# Patient Record
Sex: MALE | Race: BLACK OR AFRICAN AMERICAN | NOT HISPANIC OR LATINO | Employment: FULL TIME | URBAN - METROPOLITAN AREA
[De-identification: names, ages, dates, MRNs, and addresses within clinical notes are randomized per-mention and may not be internally consistent; named-entity substitution may affect disease eponyms.]

---

## 2019-09-05 ENCOUNTER — OFFICE VISIT (OUTPATIENT)
Dept: SLEEP MEDICINE | Facility: HOSPITAL | Age: 54
End: 2019-09-05

## 2019-09-05 VITALS
HEIGHT: 73 IN | BODY MASS INDEX: 28.63 KG/M2 | HEART RATE: 60 BPM | SYSTOLIC BLOOD PRESSURE: 124 MMHG | WEIGHT: 216 LBS | DIASTOLIC BLOOD PRESSURE: 81 MMHG

## 2019-09-05 DIAGNOSIS — G47.30 OBSERVED SLEEP APNEA: Primary | ICD-10-CM

## 2019-09-05 DIAGNOSIS — R06.83 SNORING: ICD-10-CM

## 2019-09-05 DIAGNOSIS — G47.10 HYPERSOMNIA: ICD-10-CM

## 2019-09-05 PROCEDURE — 99204 OFFICE O/P NEW MOD 45 MIN: CPT | Performed by: INTERNAL MEDICINE

## 2019-09-05 PROCEDURE — G0463 HOSPITAL OUTPT CLINIC VISIT: HCPCS

## 2019-09-05 RX ORDER — HYDROCHLOROTHIAZIDE 25 MG/1
25 TABLET ORAL DAILY
COMMUNITY
Start: 2019-06-21

## 2019-09-05 RX ORDER — LISINOPRIL 10 MG/1
10 TABLET ORAL DAILY
COMMUNITY
Start: 2018-10-22

## 2019-09-05 RX ORDER — CETIRIZINE HYDROCHLORIDE 5 MG/1
5 TABLET ORAL DAILY
COMMUNITY

## 2019-09-05 NOTE — PROGRESS NOTES
[facility]  [address] [facility] [address] [address]  Phone   Fax       Michael Dejesus  1965  54 y.o.  male      PCP:Filemon Roblero APRN  Referring Provider same as above    Type of service: Initial consult  Date of service: 9/5/2019      Chief Complaint   Patient presents with   • Sleep Apnea   • Snoring   • Daytime Sleepiness   • Fatigue       History of present illness;  Thank you for asking to see Michael Dejesus, 54 y.o.  for evaluation of sleep apnea. The patient was seen today on 9/5/2019 at [facility].  Patient is sent for evaluation of sleep apnea.  In addition he had a home sleep test in July 2019.  I have the report from [facility] [place], [place], [place].  But unfortunately the sleep study is invalid as the test duration was only 3 hours and 28 minutes.  Minimal time required is about 360 minutes or 6 hours.  However even with this limited study patient does has sleep apnea.  He also works for the [employer] and has rotating shift work.  2 days in a week he works 7A-7P, next work 12 PM to 12 AM followed by 12 AM to 7 AM.  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  Sleep schedule:  Variable sleep time due to use rotating shift work  Normally takes about 60 minutes to fall asleep  Average hours of sleep 5-6  Number of naps per day no  When patient wakes up still feels tired and not rested  Snoring yes  Witnessed apneas yes  Have you ever awakened gasping for breath, coughing, choking: Yes      Past Medical History:   Diagnosis Date   • Hypertension    • Obesity        Social history:  Shift work: Rotating shift work  Tobacco use: No  Alcohol use: No  Caffeinated drinks: 2 cups of coffee  Over-the-counter sleeping aid and medications: No  Narcotic medications: No    Family Hx  Family history of sleep apnea no family history of sleep apnea  Family History  "  Problem Relation Age of Onset   • Thyroid cancer Mother    • Hypertension Mother        Medications: reviewed    Current Outpatient Medications:   •  hydrochlorothiazide (HYDRODIURIL) 25 MG tablet, Take 25 mg by mouth Daily., Disp: , Rfl:   •  lisinopril (PRINIVIL,ZESTRIL) 10 MG tablet, Take 10 mg by mouth Daily., Disp: , Rfl:   •  cetirizine (zyrTEC) 5 MG tablet, Take 5 mg by mouth Daily., Disp: , Rfl:     Review of systems:  Seagraves Sleepiness Scale: Total score: 16   Positive for snoring, witnessed apneas, fatigue and daytime excessive sleepiness,   Negative for shortness of breath, cough, wheezing, chest pain, nausea and vomiting, palpitation, swelling of feet:    Morning headaches: 3-4 times a week  Awaken with sore throat or dry mouth : Yes  Leg jerking at night: No  Crawly feeling/urge sensation to move in the legs: No  Teeth grinding: No  Sleepwalking, nightmares, muscle weakness with laughing or anger,sleep paralysis: No  Nasal Congestion: No  Nocturia (how many times/night): No  Memory Problem: No    Physical exam:  Vitals:    09/05/19 0900   BP: 124/81   Pulse: 60   Weight: 98 kg (216 lb)   Height: 185.4 cm (73\")    Body mass index is 28.5 kg/m².    HEENT: Head is atraumatic, normocephalic   Eyes:pupils are round equal and reacting to light and accommodation, conjunctiva normal  Nose:no nasal septal defects or deviation and the nasal passages are clear, no nasal polyps,   Throat: tonsils are not enlarged, tongue normal size, oral airway Mallampati class 3  NECK: No lymphadenopathy, trachea is in the midline, thyroid not enlarged  RESPIRATORY SYSTEM: Breath sounds are equal on both sides, there are no wheezes or crackles  CARDIOVASULAR SYSTEM: Heart sounds are regular rhythm and corey rate, there are no murmurs or thrills  ABDOMEN: Soft, no hepatosplenomegaly, no evidence of ascites  EXTREMITES: No cyanosis, clubbing or edema   NEUROLOGICAL SYSTEM: Oriented x 3, no gross neurological defects, gait " normal    Medical records and labs reviewed in Epic thyroid function normal    Assessment and plan:  · Sleep apnea unspecified, (G47.30) Patient's symptoms and examination is consistent with sleep apnea.  Going to repeat a home sleep test as the first 1 has no adequate monitor time.  I have talked to the patient about importance of having adequate data.  I have talked to the patient about the signs and symptoms of sleep apnea and consequences of untreated sleep apnea. Discussed sleep testing.  I have placed a order in epic for home sleep test.  Patient will have a follow-up after this sleep test is done.   · Obesity, patient's BMI is Body mass index is 28.5 kg/m².. I have discussed the relationship between weight and sleep apnea. Weight reduction is encouraged.  I will also discussed with the patient diet and exercise.  · Snoring secondary to sleep apnea  · Hypersomnia with Walsh Sleepiness Scale of Total score: 16 due to sleep apnea.  · Hypertension.  Essential hypertension is highly correlated with sleep apnea. Treating sleep apnea will assist in good blood pressure control.  ·           Angelina De Leon MD, [facility]  Sleep Medicine.(Board-certified)  [facility]  9/5/2019 ,

## 2019-09-20 ENCOUNTER — HOSPITAL ENCOUNTER (OUTPATIENT)
Dept: SLEEP MEDICINE | Facility: HOSPITAL | Age: 54
Discharge: HOME | End: 2019-09-20
Admitting: INTERNAL MEDICINE

## 2019-09-20 DIAGNOSIS — G47.10 HYPERSOMNIA: ICD-10-CM

## 2019-09-20 DIAGNOSIS — G47.30 OBSERVED SLEEP APNEA: ICD-10-CM

## 2019-09-20 DIAGNOSIS — R06.83 SNORING: ICD-10-CM

## 2019-09-20 PROCEDURE — 95806 SLEEP STUDY UNATT&RESP EFFT: CPT | Performed by: INTERNAL MEDICINE

## 2019-09-20 PROCEDURE — 95806 SLEEP STUDY UNATT&RESP EFFT: CPT

## 2019-09-27 ENCOUNTER — TELEPHONE (OUTPATIENT)
Dept: SLEEP MEDICINE | Facility: HOSPITAL | Age: 54
End: 2019-09-27

## 2019-09-27 NOTE — TELEPHONE ENCOUNTER
Spoke with patient about sleep study results, sending orders to [facility], follow up scheduled 11/14/19

## 2019-11-14 ENCOUNTER — APPOINTMENT (OUTPATIENT)
Dept: SLEEP MEDICINE | Facility: HOSPITAL | Age: 54
End: 2019-11-14